# Patient Record
Sex: FEMALE | Race: WHITE | NOT HISPANIC OR LATINO | URBAN - METROPOLITAN AREA
[De-identification: names, ages, dates, MRNs, and addresses within clinical notes are randomized per-mention and may not be internally consistent; named-entity substitution may affect disease eponyms.]

---

## 2017-04-06 ENCOUNTER — OFFICE (OUTPATIENT)
Dept: URBAN - METROPOLITAN AREA CLINIC 101 | Facility: CLINIC | Age: 32
End: 2017-04-06

## 2017-04-06 VITALS
WEIGHT: 137 LBS | TEMPERATURE: 98.2 F | DIASTOLIC BLOOD PRESSURE: 64 MMHG | HEIGHT: 62 IN | SYSTOLIC BLOOD PRESSURE: 106 MMHG | HEART RATE: 69 BPM

## 2017-04-06 DIAGNOSIS — R63.4 ABNORMAL WEIGHT LOSS: ICD-10-CM

## 2017-04-06 DIAGNOSIS — R10.32 LEFT LOWER QUADRANT PAIN: ICD-10-CM

## 2017-04-06 DIAGNOSIS — R93.5 ABNORMAL FINDINGS ON DIAGNOSTIC IMAGING OF OTHER ABDOMINAL R: ICD-10-CM

## 2017-04-06 PROCEDURE — 99244 OFF/OP CNSLTJ NEW/EST MOD 40: CPT

## 2017-04-06 NOTE — SERVICEHPINOTES
ELSA LIMA   is a   31   year old    female who is being seen in consultation at the request of   BUCK BHAT   for abdominal pain. Reports intermittent pain to the left of umbilicus started last summer. The pain is becoming more consistent. It is a sharp pain, can last a minute or so. Last night it lasted about 20 minutes this prompted her to go to  ER. She had a Ct abd/pelvis w/ contrast 3/23/2017-suggestion of mild wall thickening of descending colon. She has a BM daily. Stools are BSS type 3-4. A couple of weeks ago she noticed mucous in her stools, but none recently. She saw her PCP was given a course of cipro and flagyl, unsure if it helped. The ER gave her amoxicillin last night for colitis. She has lost about #8 since February, she doesn't have much of an appetite. She has been having night sweats since she had her daughter 6 years ago.  Denies n/v, fever, joint pain. No vision complaints.  No family history of IBD or colon cancer.  Labs 4/5/2017 wbc-7.9, hgb-13.3, hct-39, plt-202, creatinine-0.86, AST-13, ALT-29, alk phos-76, lipase-147.BR

## 2017-04-26 ENCOUNTER — ON CAMPUS - OUTPATIENT (OUTPATIENT)
Dept: URBAN - METROPOLITAN AREA HOSPITAL 35 | Facility: HOSPITAL | Age: 32
End: 2017-04-26

## 2017-04-26 DIAGNOSIS — R63.4 ABNORMAL WEIGHT LOSS: ICD-10-CM

## 2017-04-26 DIAGNOSIS — K50.00 CROHN'S DISEASE OF SMALL INTESTINE WITHOUT COMPLICATIONS: ICD-10-CM

## 2017-04-26 DIAGNOSIS — R19.7 DIARRHEA, UNSPECIFIED: ICD-10-CM

## 2017-04-26 DIAGNOSIS — R10.12 LEFT UPPER QUADRANT PAIN: ICD-10-CM

## 2017-04-26 PROCEDURE — 45380 COLONOSCOPY AND BIOPSY: CPT
